# Patient Record
Sex: FEMALE | Race: WHITE | Employment: FULL TIME | URBAN - METROPOLITAN AREA
[De-identification: names, ages, dates, MRNs, and addresses within clinical notes are randomized per-mention and may not be internally consistent; named-entity substitution may affect disease eponyms.]

---

## 2018-03-17 ENCOUNTER — HOSPITAL ENCOUNTER (EMERGENCY)
Age: 33
Discharge: HOME | End: 2018-03-17
Attending: FAMILY MEDICINE
Payer: COMMERCIAL

## 2018-03-17 VITALS
WEIGHT: 192 LBS | DIASTOLIC BLOOD PRESSURE: 78 MMHG | RESPIRATION RATE: 18 BRPM | HEIGHT: 68 IN | BODY MASS INDEX: 29.1 KG/M2 | HEART RATE: 78 BPM | OXYGEN SATURATION: 100 % | TEMPERATURE: 98 F | SYSTOLIC BLOOD PRESSURE: 121 MMHG

## 2018-03-17 DIAGNOSIS — B02.9 HERPES ZOSTER WITHOUT COMPLICATION: Primary | ICD-10-CM

## 2018-03-17 PROCEDURE — 6370000000 HC RX 637 (ALT 250 FOR IP): Performed by: FAMILY MEDICINE

## 2018-03-17 PROCEDURE — 99283 EMERGENCY DEPT VISIT LOW MDM: CPT

## 2018-03-17 RX ORDER — TRAMADOL HYDROCHLORIDE 50 MG/1
50 TABLET ORAL EVERY 6 HOURS PRN
Qty: 12 TABLET | Refills: 0 | Status: SHIPPED | OUTPATIENT
Start: 2018-03-17 | End: 2018-03-17

## 2018-03-17 RX ORDER — CIPROFLOXACIN AND DEXAMETHASONE 3; 1 MG/ML; MG/ML
4 SUSPENSION/ DROPS AURICULAR (OTIC) 2 TIMES DAILY
Qty: 1 BOTTLE | Refills: 0 | Status: SHIPPED | OUTPATIENT
Start: 2018-03-17 | End: 2018-03-24

## 2018-03-17 RX ORDER — DOXYCYCLINE 100 MG/1
100 CAPSULE ORAL 2 TIMES DAILY
Qty: 20 CAPSULE | Refills: 0 | Status: SHIPPED | OUTPATIENT
Start: 2018-03-17 | End: 2018-03-27

## 2018-03-17 RX ORDER — VALACYCLOVIR HYDROCHLORIDE 500 MG/1
1000 TABLET, FILM COATED ORAL ONCE
Status: COMPLETED | OUTPATIENT
Start: 2018-03-17 | End: 2018-03-17

## 2018-03-17 RX ORDER — FLUTICASONE PROPIONATE 50 MCG
1 SPRAY, SUSPENSION (ML) NASAL DAILY
COMMUNITY

## 2018-03-17 RX ORDER — IBUPROFEN 800 MG/1
800 TABLET ORAL EVERY 6 HOURS PRN
COMMUNITY

## 2018-03-17 RX ORDER — AMOXICILLIN 875 MG/1
875 TABLET, COATED ORAL 2 TIMES DAILY
COMMUNITY
End: 2022-08-01

## 2018-03-17 RX ORDER — VALACYCLOVIR HYDROCHLORIDE 500 MG/1
1000 TABLET, FILM COATED ORAL 2 TIMES DAILY
Qty: 40 TABLET | Refills: 0 | Status: SHIPPED | OUTPATIENT
Start: 2018-03-17 | End: 2018-03-27

## 2018-03-17 RX ADMIN — VALACYCLOVIR HYDROCHLORIDE 1000 MG: 500 TABLET, FILM COATED ORAL at 18:32

## 2018-03-17 ASSESSMENT — PAIN DESCRIPTION - ORIENTATION: ORIENTATION: RIGHT

## 2018-03-17 ASSESSMENT — PAIN DESCRIPTION - PAIN TYPE: TYPE: ACUTE PAIN

## 2018-03-17 ASSESSMENT — PAIN SCALES - GENERAL
PAINLEVEL_OUTOF10: 10
PAINLEVEL_OUTOF10: 8

## 2018-03-17 ASSESSMENT — PAIN DESCRIPTION - DESCRIPTORS: DESCRIPTORS: PRESSURE

## 2018-03-17 ASSESSMENT — PAIN DESCRIPTION - ONSET: ONSET: ON-GOING

## 2018-03-17 ASSESSMENT — PAIN DESCRIPTION - PROGRESSION: CLINICAL_PROGRESSION: GRADUALLY WORSENING

## 2018-03-17 ASSESSMENT — PAIN DESCRIPTION - FREQUENCY: FREQUENCY: INTERMITTENT

## 2018-03-17 NOTE — ED PROVIDER NOTES
[address] [facility] ED  eMERGENCY dEPARTMENT eNCOUnter      Pt Name: Rodney Roman  MRN: [id number]  [place] 1985  Date of evaluation: 3/17/2018  Provider: MD Tracy Snell       Chief Complaint   Patient presents with    Ear Problem     left ear. redness and swelling since thursday night with left sinus/jaw pain. HISTORY OF PRESENT ILLNESS   (Location/Symptom, Timing/Onset, Context/Setting, Quality, Duration, Modifying Factors, Severity)  Note limiting factors. Rodney Roman is a 27 y.o. female who presents to the emergency department  Patient presented Patient present with Left external ear pain and redness clear drainage  ×3 days Woke up this morning noted redness and swelling of the ear with some sores and vesicular lesion. She also have been having a sinus infection for the last week was already seen by her primary care and started on antibiotic for        The history is provided by the patient. Nursing Notes were reviewed. REVIEW OF SYSTEMS    (2-9 systems for level 4, 10 or more for level 5)     Review of Systems   Constitutional: Negative. HENT: Positive for congestion, ear discharge, ear pain, rhinorrhea, sinus pain, sinus pressure and sneezing. Negative for facial swelling, hearing loss, mouth sores, nosebleeds, postnasal drip, sore throat, tinnitus, trouble swallowing and voice change. Eyes: Negative. Respiratory: Negative. Cardiovascular: Negative. Gastrointestinal: Negative. Endocrine: Negative. Genitourinary: Negative. Musculoskeletal: Negative. Neurological: Negative. Hematological: Negative. Psychiatric/Behavioral: Negative. Except as noted above the remainder of the review of systems was reviewed and negative. PAST MEDICAL HISTORY   History reviewed. No pertinent past medical history.       SURGICAL HISTORY       Past Surgical History:   Procedure Laterality Date     SECTION      4         CURRENT 1. Herpes zoster without complication          DISPOSITION/PLAN   DISPOSITION Decision To Discharge 03/17/2018 06:54:11 PM      PATIENT REFERRED TO:  No follow-up provider specified.     DISCHARGE MEDICATIONS:  Discharge Medication List as of 3/17/2018  6:56 PM      START taking these medications    Details   valACYclovir (VALTREX) 500 MG tablet Take 2 tablets by mouth 2 times daily for 10 days, Disp-40 tablet, R-0Print      doxycycline monohydrate (MONODOX) 100 MG capsule Take 1 capsule by mouth 2 times daily for 10 days, Disp-20 capsule, R-0Print      ciprofloxacin-dexamethasone (CIPRODEX) 0.3-0.1 % otic suspension Place 4 drops in ear(s) 2 times daily for 7 days, Disp-1 Bottle, R-0Print                (Please note that portions of this note were completed with a voice recognition program.  Efforts were made to edit the dictations but occasionally words are mis-transcribed.)    Martin Holloway MD (electronically signed)  Attending Emergency Physician          Belinda Ornelas MD  03/18/18 [phone]

## 2018-03-18 ASSESSMENT — ENCOUNTER SYMPTOMS
RESPIRATORY NEGATIVE: 1
GASTROINTESTINAL NEGATIVE: 1
SORE THROAT: 0
EYES NEGATIVE: 1
TROUBLE SWALLOWING: 0
RHINORRHEA: 1
VOICE CHANGE: 0
SINUS PRESSURE: 1
FACIAL SWELLING: 0
SINUS PAIN: 1

## 2019-03-11 ENCOUNTER — HOSPITAL ENCOUNTER (EMERGENCY)
Age: 34
Discharge: HOME | End: 2019-03-11
Attending: EMERGENCY MEDICINE
Payer: COMMERCIAL

## 2019-03-11 VITALS
RESPIRATION RATE: 16 BRPM | TEMPERATURE: 98.9 F | DIASTOLIC BLOOD PRESSURE: 73 MMHG | HEART RATE: 85 BPM | HEIGHT: 68 IN | SYSTOLIC BLOOD PRESSURE: 141 MMHG | BODY MASS INDEX: 30.77 KG/M2 | WEIGHT: 203 LBS | OXYGEN SATURATION: 99 %

## 2019-03-11 DIAGNOSIS — J01.10 ACUTE FRONTAL SINUSITIS, RECURRENCE NOT SPECIFIED: ICD-10-CM

## 2019-03-11 DIAGNOSIS — R05.9 COUGH: Primary | ICD-10-CM

## 2019-03-11 LAB
RAPID INFLUENZA  B AGN: NEGATIVE
RAPID INFLUENZA A AGN: NEGATIVE
S PYO AG THROAT QL: NEGATIVE

## 2019-03-11 PROCEDURE — 99283 EMERGENCY DEPT VISIT LOW MDM: CPT

## 2019-03-11 PROCEDURE — 87081 CULTURE SCREEN ONLY: CPT

## 2019-03-11 PROCEDURE — 87880 STREP A ASSAY W/OPTIC: CPT

## 2019-03-11 PROCEDURE — 87804 INFLUENZA ASSAY W/OPTIC: CPT

## 2019-03-11 RX ORDER — PREDNISONE 20 MG/1
20 TABLET ORAL DAILY
Qty: 5 TABLET | Refills: 0 | Status: SHIPPED | OUTPATIENT
Start: 2019-03-11 | End: 2019-03-16

## 2019-03-11 RX ORDER — GUAIFENESIN AND PSEUDOEPHEDRINE HCL 1200; 120 MG/1; MG/1
1 TABLET, EXTENDED RELEASE ORAL 2 TIMES DAILY
Qty: 20 TABLET | Refills: 0 | Status: SHIPPED | OUTPATIENT
Start: 2019-03-11 | End: 2022-08-16

## 2019-03-11 RX ORDER — DOXYCYCLINE 100 MG/1
100 TABLET ORAL 2 TIMES DAILY
Qty: 20 TABLET | Refills: 0 | Status: SHIPPED | OUTPATIENT
Start: 2019-03-11 | End: 2019-03-21

## 2019-03-11 ASSESSMENT — ENCOUNTER SYMPTOMS
SORE THROAT: 0
BACK PAIN: 0
EYE PAIN: 0
COUGH: 1
WHEEZING: 0
VOMITING: 0
CONSTIPATION: 0
CHOKING: 0
CHEST TIGHTNESS: 0
STRIDOR: 0
SINUS PRESSURE: 0
FACIAL SWELLING: 1
SHORTNESS OF BREATH: 0
ABDOMINAL PAIN: 0
BLOOD IN STOOL: 0
RHINORRHEA: 1
EYE REDNESS: 0
DIARRHEA: 0
VOICE CHANGE: 0
EYE DISCHARGE: 0
TROUBLE SWALLOWING: 0

## 2019-03-11 ASSESSMENT — PAIN SCALES - GENERAL: PAINLEVEL_OUTOF10: 6

## 2019-03-11 ASSESSMENT — PAIN DESCRIPTION - ORIENTATION: ORIENTATION: LEFT

## 2019-03-11 ASSESSMENT — PAIN DESCRIPTION - LOCATION: LOCATION: FACE;HEAD

## 2019-03-14 LAB — S PYO THROAT QL CULT: NORMAL

## 2022-08-01 PROBLEM — J30.2 SEASONAL ALLERGIES: Status: ACTIVE | Noted: 2022-08-01

## 2022-08-01 PROBLEM — R00.0 TACHYCARDIA: Status: ACTIVE | Noted: 2022-08-01

## 2022-08-01 PROBLEM — E66.3 OVERWEIGHT: Status: ACTIVE | Noted: 2022-08-01

## 2022-08-01 PROBLEM — D50.9 IRON DEFICIENCY ANEMIA: Status: ACTIVE | Noted: 2022-08-01

## 2022-08-01 PROBLEM — E55.9 VITAMIN D DEFICIENCY: Status: ACTIVE | Noted: 2022-08-01

## 2022-08-16 ENCOUNTER — OFFICE VISIT (OUTPATIENT)
Dept: OBGYN CLINIC | Age: 37
End: 2022-08-16
Payer: COMMERCIAL

## 2022-08-16 VITALS
SYSTOLIC BLOOD PRESSURE: 92 MMHG | WEIGHT: 207 LBS | HEART RATE: 94 BPM | DIASTOLIC BLOOD PRESSURE: 66 MMHG | BODY MASS INDEX: 31.47 KG/M2

## 2022-08-16 DIAGNOSIS — Z11.51 ENCOUNTER FOR SCREENING FOR HUMAN PAPILLOMAVIRUS (HPV): ICD-10-CM

## 2022-08-16 DIAGNOSIS — Z11.51 ENCOUNTER FOR SCREENING FOR HUMAN PAPILLOMAVIRUS (HPV): Primary | ICD-10-CM

## 2022-08-16 DIAGNOSIS — N92.0 MENORRHAGIA WITH REGULAR CYCLE: ICD-10-CM

## 2022-08-16 DIAGNOSIS — Z01.419 WOMEN'S ANNUAL ROUTINE GYNECOLOGICAL EXAMINATION: ICD-10-CM

## 2022-08-16 PROCEDURE — 99385 PREV VISIT NEW AGE 18-39: CPT | Performed by: OBSTETRICS & GYNECOLOGY

## 2022-08-16 RX ORDER — CETIRIZINE HYDROCHLORIDE 10 MG/1
TABLET ORAL
COMMUNITY
Start: 2022-07-17

## 2022-08-16 ASSESSMENT — ENCOUNTER SYMPTOMS
NAUSEA: 0
TROUBLE SWALLOWING: 0
BLOOD IN STOOL: 0
SHORTNESS OF BREATH: 0
VOICE CHANGE: 0
COUGH: 0
BACK PAIN: 0
ABDOMINAL DISTENTION: 0
WHEEZING: 0
ABDOMINAL PAIN: 0
CONSTIPATION: 0
VOMITING: 0
SORE THROAT: 0
CHEST TIGHTNESS: 0
COLOR CHANGE: 0

## 2022-08-16 NOTE — PROGRESS NOTES
HPI:  Verónica Wagner (: 1985) is a 42 y.o. female, New patient, here for evaluation of the following chief complaint(s): Annual Exam  Complains of menorrhagia. She also has back pain. Patient has tried hormonal contraception to control the bleeding which has been unsuccessful. Her last Pap was 7 years ago. She does have a history of abnormal Pap smears. She has had 4 prior  section      SUBJECTIVE/OBJECTIVE:    Past Surgical History:   Procedure Laterality Date     SECTION      4    CHOLECYSTECTOMY      TONSILLECTOMY          Review of Systems   Constitutional:  Negative for activity change, appetite change, fatigue and unexpected weight change. HENT:  Negative for dental problem, ear pain, hearing loss, nosebleeds, sore throat, trouble swallowing and voice change. Eyes:  Negative for visual disturbance. Respiratory:  Negative for cough, chest tightness, shortness of breath and wheezing. Cardiovascular:  Negative for chest pain and palpitations. Gastrointestinal:  Negative for abdominal distention, abdominal pain, blood in stool, constipation, nausea and vomiting. Endocrine: Negative for cold intolerance, heat intolerance, polydipsia, polyphagia and polyuria. Genitourinary:  Positive for menstrual problem and pelvic pain. Negative for difficulty urinating, dyspareunia, dysuria, flank pain, frequency, genital sores, hematuria, urgency, vaginal bleeding, vaginal discharge and vaginal pain. Musculoskeletal:  Negative for arthralgias, back pain, joint swelling and myalgias. Skin:  Negative for color change and rash. Allergic/Immunologic: Negative for environmental allergies, food allergies and immunocompromised state. Neurological:  Negative for dizziness, seizures, syncope, speech difficulty, weakness, numbness and headaches. Hematological:  Negative for adenopathy. Does not bruise/bleed easily.    Psychiatric/Behavioral:  Negative for agitation, behavioral problems, confusion, decreased concentration, dysphoric mood and suicidal ideas. The patient is not nervous/anxious and is not hyperactive. Physical Exam  Vitals and nursing note reviewed. Exam conducted with a chaperone present. Chest:       Genitourinary:     General: Normal vulva. Vitals:    08/16/22 1128   BP: 92/66   Pulse: 94   Weight: 207 lb (93.9 kg)         ASSESSMENT/PLAN:     Diagnosis Orders   1. Encounter for screening for human papillomavirus (HPV)        2. Women's annual routine gynecological examination        Menorrhagia      PLAN: Operative laparoscopy with bilateral salpingectomy. Hysteroscopy NovaSure ablation and a D&C. Risk benefits alternatives of the procedure were explained to her in detail      No follow-ups on file. On this date 8/16/2022 I have spent 30 minutes reviewing previous notes, test results and face to face with the patient discussing the diagnosis and importance of compliance with the treatment plan as well as documenting on the day of the visit. An electronic signature was used to authenticate this note. Please note this report has been partially produced using speech recognition software and may cause contain errors related to that system including grammar, punctuation and spelling as well as words and phrases that may seem inappropriate. If there are questions or concerns please feel free to contact me to clarify.

## 2022-08-23 LAB
HPV COMMENT: NORMAL
HPV TYPE 16: NOT DETECTED
HPV TYPE 18: NOT DETECTED
HPVOH (OTHER TYPES): NOT DETECTED

## 2022-09-21 ENCOUNTER — TELEPHONE (OUTPATIENT)
Dept: OBGYN CLINIC | Age: 37
End: 2022-09-21

## 2022-09-21 NOTE — TELEPHONE ENCOUNTER
Pt has PAT at 9 am on 9/21. She will need to cancel this. She is not feeling well and will also have to cancel her surgery as well on Monday. Pt states she will call back when she is feeling better.

## 2023-07-19 LAB
ALANINE AMINOTRANSFERASE (SGPT) (U/L) IN SER/PLAS: 20 U/L (ref 7–45)
ALBUMIN (G/DL) IN SER/PLAS: 3.8 G/DL (ref 3.4–5)
ALKALINE PHOSPHATASE (U/L) IN SER/PLAS: 51 U/L (ref 33–110)
ANION GAP IN SER/PLAS: 9 MMOL/L (ref 10–20)
ASPARTATE AMINOTRANSFERASE (SGOT) (U/L) IN SER/PLAS: 17 U/L (ref 9–39)
BASOPHILS (10*3/UL) IN BLOOD BY AUTOMATED COUNT: 0.04 X10E9/L (ref 0–0.1)
BASOPHILS/100 LEUKOCYTES IN BLOOD BY AUTOMATED COUNT: 0.5 % (ref 0–2)
BILIRUBIN TOTAL (MG/DL) IN SER/PLAS: 0.5 MG/DL (ref 0–1.2)
CALCIDIOL (25 OH VITAMIN D3) (NG/ML) IN SER/PLAS: 23 NG/ML
CALCIUM (MG/DL) IN SER/PLAS: 9.1 MG/DL (ref 8.6–10.3)
CARBON DIOXIDE, TOTAL (MMOL/L) IN SER/PLAS: 28 MMOL/L (ref 21–32)
CHLORIDE (MMOL/L) IN SER/PLAS: 106 MMOL/L (ref 98–107)
CHOLESTEROL (MG/DL) IN SER/PLAS: 151 MG/DL (ref 0–199)
CHOLESTEROL IN HDL (MG/DL) IN SER/PLAS: 29.3 MG/DL
CHOLESTEROL/HDL RATIO: 5.2
COBALAMIN (VITAMIN B12) (PG/ML) IN SER/PLAS: 462 PG/ML (ref 211–911)
CREATININE (MG/DL) IN SER/PLAS: 0.71 MG/DL (ref 0.5–1.05)
EOSINOPHILS (10*3/UL) IN BLOOD BY AUTOMATED COUNT: 0.25 X10E9/L (ref 0–0.7)
EOSINOPHILS/100 LEUKOCYTES IN BLOOD BY AUTOMATED COUNT: 2.9 % (ref 0–6)
ERYTHROCYTE DISTRIBUTION WIDTH (RATIO) BY AUTOMATED COUNT: 13.9 % (ref 11.5–14.5)
ERYTHROCYTE MEAN CORPUSCULAR HEMOGLOBIN CONCENTRATION (G/DL) BY AUTOMATED: 32.6 G/DL (ref 32–36)
ERYTHROCYTE MEAN CORPUSCULAR VOLUME (FL) BY AUTOMATED COUNT: 88 FL (ref 80–100)
ERYTHROCYTES (10*6/UL) IN BLOOD BY AUTOMATED COUNT: 4.76 X10E12/L (ref 4–5.2)
FERRITIN (UG/LL) IN SER/PLAS: 54 UG/L (ref 8–150)
GFR FEMALE: >90 ML/MIN/1.73M2
GLUCOSE (MG/DL) IN SER/PLAS: 84 MG/DL (ref 74–99)
HEMATOCRIT (%) IN BLOOD BY AUTOMATED COUNT: 41.7 % (ref 36–46)
HEMOGLOBIN (G/DL) IN BLOOD: 13.6 G/DL (ref 12–16)
IMMATURE GRANULOCYTES/100 LEUKOCYTES IN BLOOD BY AUTOMATED COUNT: 0.2 % (ref 0–0.9)
IRON (UG/DL) IN SER/PLAS: 159 UG/DL (ref 35–150)
IRON BINDING CAPACITY (UG/DL) IN SER/PLAS: 280 UG/DL (ref 240–445)
IRON SATURATION (%) IN SER/PLAS: 57 % (ref 25–45)
LDL: 89 MG/DL (ref 0–99)
LEUKOCYTES (10*3/UL) IN BLOOD BY AUTOMATED COUNT: 8.7 X10E9/L (ref 4.4–11.3)
LYMPHOCYTES (10*3/UL) IN BLOOD BY AUTOMATED COUNT: 4.47 X10E9/L (ref 1.2–4.8)
LYMPHOCYTES/100 LEUKOCYTES IN BLOOD BY AUTOMATED COUNT: 51.4 % (ref 13–44)
MONOCYTES (10*3/UL) IN BLOOD BY AUTOMATED COUNT: 0.74 X10E9/L (ref 0.1–1)
MONOCYTES/100 LEUKOCYTES IN BLOOD BY AUTOMATED COUNT: 8.5 % (ref 2–10)
NEUTROPHILS (10*3/UL) IN BLOOD BY AUTOMATED COUNT: 3.18 X10E9/L (ref 1.2–7.7)
NEUTROPHILS/100 LEUKOCYTES IN BLOOD BY AUTOMATED COUNT: 36.5 % (ref 40–80)
PLATELETS (10*3/UL) IN BLOOD AUTOMATED COUNT: 313 X10E9/L (ref 150–450)
POTASSIUM (MMOL/L) IN SER/PLAS: 3.9 MMOL/L (ref 3.5–5.3)
PROTEIN TOTAL: 6.4 G/DL (ref 6.4–8.2)
SODIUM (MMOL/L) IN SER/PLAS: 139 MMOL/L (ref 136–145)
THYROTROPIN (MIU/L) IN SER/PLAS BY DETECTION LIMIT <= 0.05 MIU/L: 1.42 MIU/L (ref 0.44–3.98)
TRIGLYCERIDE (MG/DL) IN SER/PLAS: 163 MG/DL (ref 0–149)
UREA NITROGEN (MG/DL) IN SER/PLAS: 13 MG/DL (ref 6–23)
VLDL: 33 MG/DL (ref 0–40)

## 2023-09-21 ENCOUNTER — TELEMEDICINE (OUTPATIENT)
Dept: PRIMARY CARE | Facility: CLINIC | Age: 38
End: 2023-09-21
Payer: COMMERCIAL

## 2023-09-21 DIAGNOSIS — K04.7 DENTAL INFECTION: Primary | ICD-10-CM

## 2023-09-21 PROCEDURE — 99213 OFFICE O/P EST LOW 20 MIN: CPT | Performed by: STUDENT IN AN ORGANIZED HEALTH CARE EDUCATION/TRAINING PROGRAM

## 2023-09-21 RX ORDER — CEFUROXIME AXETIL 500 MG/1
500 TABLET ORAL 2 TIMES DAILY
Qty: 20 TABLET | Refills: 0 | Status: SHIPPED | OUTPATIENT
Start: 2023-09-21 | End: 2023-10-02 | Stop reason: WASHOUT

## 2023-09-21 RX ORDER — METRONIDAZOLE 500 MG/1
250 TABLET ORAL 4 TIMES DAILY
Qty: 20 TABLET | Refills: 0 | Status: SHIPPED | OUTPATIENT
Start: 2023-09-21 | End: 2023-10-02 | Stop reason: WASHOUT

## 2023-09-21 NOTE — PROGRESS NOTES
Subjective   Patient ID: Dolly Mulligan is a 38 y.o. female who presents for Dental Pain.    HPI    Dental infection being treated with amoxacillin, which is not helping. Has used Clindamycin for a different infection before that. Now pain radiating to the ear as well. Worsening symptoms.     Review of Systems  ROS negative except discussed above in HPI.    There were no vitals filed for this visit.  Objective   Physical Exam  Unable to exam due to the nature of the visit.     Assessment/Plan   Dolly was seen today for dental pain.  Diagnoses and all orders for this visit:  Dental infection (Primary)  -     metroNIDAZOLE (Flagyl) 500 mg tablet; Take 0.5 tablets (250 mg) by mouth 4 times a day for 10 days.  -     cefuroxime (Ceftin) 500 mg tablet; Take 1 tablet (500 mg) by mouth 2 times a day for 10 days.  Discussed about the potential complications including dental abscess which may need to be drained. Recommended to go see dentist or other physician to get further evaluation if abx are not helping.     Follow up as needed.         Dave Andersen MD MPH

## 2023-09-30 PROBLEM — M25.519 SHOULDER PAIN: Status: ACTIVE | Noted: 2023-09-30

## 2023-09-30 PROBLEM — M77.8 TENDINITIS OF RIGHT SHOULDER: Status: ACTIVE | Noted: 2023-09-30

## 2023-09-30 PROBLEM — S93.609A FOOT SPRAIN: Status: ACTIVE | Noted: 2023-09-30

## 2023-09-30 PROBLEM — L72.3 SEBACEOUS CYST OF EAR: Status: ACTIVE | Noted: 2023-09-30

## 2023-09-30 PROBLEM — B02.9 SHINGLES: Status: ACTIVE | Noted: 2023-09-30

## 2023-09-30 PROBLEM — B37.31 YEAST VAGINITIS: Status: ACTIVE | Noted: 2023-09-30

## 2023-09-30 PROBLEM — H69.93 DYSFUNCTION OF BOTH EUSTACHIAN TUBES: Status: ACTIVE | Noted: 2023-09-30

## 2023-09-30 PROBLEM — H73.891 TYMPANIC MEMBRANE RETRACTION, RIGHT: Status: ACTIVE | Noted: 2023-09-30

## 2023-09-30 PROBLEM — H90.41 SENSORINEURAL HEARING LOSS (SNHL) OF RIGHT EAR WITH UNRESTRICTED HEARING OF LEFT EAR: Status: ACTIVE | Noted: 2023-09-30

## 2023-09-30 PROBLEM — R00.0 TACHYCARDIA: Status: ACTIVE | Noted: 2022-08-01

## 2023-09-30 PROBLEM — F41.9 ANXIETY: Status: ACTIVE | Noted: 2023-09-30

## 2023-09-30 PROBLEM — Z79.899 DRUG THERAPY: Status: ACTIVE | Noted: 2023-09-30

## 2023-09-30 PROBLEM — M79.676 TOE PAIN: Status: ACTIVE | Noted: 2023-09-30

## 2023-09-30 PROBLEM — E78.5 HYPERLIPIDEMIA: Status: ACTIVE | Noted: 2023-09-30

## 2023-09-30 PROBLEM — T78.40XA ALLERGIC REACTION: Status: ACTIVE | Noted: 2023-09-30

## 2023-09-30 PROBLEM — D50.9 IRON DEFICIENCY ANEMIA: Status: ACTIVE | Noted: 2022-08-01

## 2023-09-30 PROBLEM — M54.2 NECK PAIN: Status: ACTIVE | Noted: 2023-09-30

## 2023-09-30 PROBLEM — S90.129A CONTUSION OF TOE: Status: ACTIVE | Noted: 2023-09-30

## 2023-09-30 PROBLEM — S90.30XA FOOT CONTUSION: Status: ACTIVE | Noted: 2023-09-30

## 2023-09-30 PROBLEM — M25.579 ANKLE PAIN: Status: ACTIVE | Noted: 2023-09-30

## 2023-09-30 PROBLEM — R79.89 ABNORMAL TSH: Status: ACTIVE | Noted: 2023-09-30

## 2023-09-30 PROBLEM — E04.1 THYROID NODULE: Status: ACTIVE | Noted: 2023-09-30

## 2023-09-30 PROBLEM — E04.9 GOITER: Status: ACTIVE | Noted: 2023-09-30

## 2023-09-30 PROBLEM — M67.40 GANGLION CYST: Status: ACTIVE | Noted: 2023-09-30

## 2023-09-30 PROBLEM — E66.3 OVERWEIGHT: Status: ACTIVE | Noted: 2022-08-01

## 2023-09-30 PROBLEM — H66.90 RECURRENT OTITIS MEDIA: Status: ACTIVE | Noted: 2023-09-30

## 2023-09-30 PROBLEM — E55.9 VITAMIN D DEFICIENCY: Status: ACTIVE | Noted: 2022-08-01

## 2023-09-30 PROBLEM — M79.673 FOOT PAIN: Status: ACTIVE | Noted: 2023-09-30

## 2023-09-30 PROBLEM — H65.21 CHRONIC SEROUS OTITIS MEDIA OF RIGHT EAR: Status: ACTIVE | Noted: 2023-09-30

## 2023-09-30 PROBLEM — R59.0 CERVICAL LYMPHADENOPATHY: Status: ACTIVE | Noted: 2023-09-30

## 2023-09-30 PROBLEM — R22.1 NECK SWELLING: Status: ACTIVE | Noted: 2023-09-30

## 2023-09-30 RX ORDER — CETIRIZINE HYDROCHLORIDE 10 MG/1
10 TABLET ORAL DAILY
COMMUNITY

## 2023-09-30 RX ORDER — ACETAMINOPHEN 325 MG/1
TABLET ORAL
COMMUNITY

## 2023-09-30 RX ORDER — LIDOCAINE 40 MG/G
CREAM TOPICAL 4 TIMES DAILY
COMMUNITY
Start: 2023-09-09

## 2023-09-30 RX ORDER — CHOLECALCIFEROL (VITAMIN D3) 125 MCG
125 CAPSULE ORAL DAILY
COMMUNITY
Start: 2017-10-11 | End: 2023-10-02 | Stop reason: ALTCHOICE

## 2023-09-30 RX ORDER — ALBUTEROL SULFATE 90 UG/1
2 AEROSOL, METERED RESPIRATORY (INHALATION) 4 TIMES DAILY PRN
COMMUNITY
Start: 2022-11-01

## 2023-09-30 RX ORDER — AZELASTINE 1 MG/ML
SPRAY, METERED NASAL
COMMUNITY
Start: 2021-11-21

## 2023-09-30 RX ORDER — IBUPROFEN 600 MG/1
600 TABLET ORAL AS NEEDED
COMMUNITY
Start: 2017-08-01

## 2023-09-30 RX ORDER — ERGOCALCIFEROL 1.25 MG/1
1 CAPSULE ORAL
COMMUNITY
Start: 2023-07-19

## 2023-09-30 RX ORDER — AZELASTINE HYDROCHLORIDE 0.5 MG/ML
SOLUTION/ DROPS OPHTHALMIC
COMMUNITY
Start: 2021-12-21

## 2023-09-30 RX ORDER — ACETAMINOPHEN AND CODEINE PHOSPHATE 300; 30 MG/1; MG/1
1 TABLET ORAL AS NEEDED
COMMUNITY

## 2023-09-30 RX ORDER — HYDROCODONE BITARTRATE AND ACETAMINOPHEN 5; 325 MG/1; MG/1
1-2 TABLET ORAL AS NEEDED
COMMUNITY
Start: 2022-01-12

## 2023-09-30 RX ORDER — FLUTICASONE PROPIONATE 50 MCG
1 SPRAY, SUSPENSION (ML) NASAL DAILY
COMMUNITY

## 2023-10-02 ENCOUNTER — OFFICE VISIT (OUTPATIENT)
Dept: PRIMARY CARE | Facility: CLINIC | Age: 38
End: 2023-10-02
Payer: COMMERCIAL

## 2023-10-02 VITALS
TEMPERATURE: 97.9 F | HEART RATE: 76 BPM | SYSTOLIC BLOOD PRESSURE: 122 MMHG | OXYGEN SATURATION: 98 % | BODY MASS INDEX: 31.31 KG/M2 | RESPIRATION RATE: 18 BRPM | WEIGHT: 194 LBS | DIASTOLIC BLOOD PRESSURE: 76 MMHG

## 2023-10-02 DIAGNOSIS — J06.9 ACUTE URI: Primary | ICD-10-CM

## 2023-10-02 LAB — POC RAPID STREP: NEGATIVE

## 2023-10-02 PROCEDURE — 87880 STREP A ASSAY W/OPTIC: CPT | Performed by: FAMILY MEDICINE

## 2023-10-02 PROCEDURE — 99214 OFFICE O/P EST MOD 30 MIN: CPT | Performed by: FAMILY MEDICINE

## 2023-10-02 ASSESSMENT — ENCOUNTER SYMPTOMS
DIARRHEA: 0
FEVER: 0
WHEEZING: 0
SHORTNESS OF BREATH: 0
SORE THROAT: 1
DIFFICULTY URINATING: 0
TROUBLE SWALLOWING: 1
VOMITING: 0
MYALGIAS: 1
RHINORRHEA: 1
HEADACHES: 1
FATIGUE: 1
NAUSEA: 0
COUGH: 1

## 2023-10-02 NOTE — ASSESSMENT & PLAN NOTE
Advise testing for rst/pcr, covid/flu  Will tx if needed  May use azelastind ns,cetirizine and flonase  Tylenol or motrin, delsym as needed  F/up if no improvement

## 2023-10-02 NOTE — PROGRESS NOTES
Subjective   Patient ID: Dolly Mulligan is a 38 y.o. female who presents for Sore Throat.    Sore Throat   This is a new problem. The current episode started in the past 7 days. The problem has been unchanged. Associated symptoms include congestion, coughing, ear pain, headaches and trouble swallowing. Pertinent negatives include no diarrhea, ear discharge, shortness of breath or vomiting. She has tried NSAIDs for the symptoms. The treatment provided mild relief.        Review of Systems   Constitutional:  Positive for fatigue. Negative for fever.        Symptoms x 4 d   HENT:  Positive for congestion, ear pain, rhinorrhea, sore throat and trouble swallowing. Negative for ear discharge.    Respiratory:  Positive for cough. Negative for shortness of breath and wheezing.    Gastrointestinal:  Negative for diarrhea, nausea and vomiting.   Genitourinary:  Negative for difficulty urinating.   Musculoskeletal:  Positive for myalgias.   Neurological:  Positive for headaches.       Objective   /76   Pulse 76   Temp 36.6 °C (97.9 °F) (Temporal)   Resp 18   Wt 88 kg (194 lb)   SpO2 98%   BMI 31.31 kg/m²     Physical Exam  Vitals and nursing note reviewed.   Constitutional:       General: She is not in acute distress.     Appearance: Normal appearance.   HENT:      Head: Normocephalic and atraumatic.      Right Ear: Tympanic membrane, ear canal and external ear normal.      Left Ear: Tympanic membrane, ear canal and external ear normal.      Nose: Congestion present.      Mouth/Throat:      Mouth: Mucous membranes are moist.      Pharynx: Posterior oropharyngeal erythema present.   Cardiovascular:      Rate and Rhythm: Normal rate and regular rhythm.      Heart sounds: Normal heart sounds.   Pulmonary:      Effort: Pulmonary effort is normal.      Breath sounds: Normal breath sounds.   Musculoskeletal:      Cervical back: Neck supple.   Lymphadenopathy:      Cervical: No cervical adenopathy.   Skin:     General:  Skin is warm and dry.   Neurological:      Mental Status: She is alert.         Assessment/Plan   Problem List Items Addressed This Visit             ICD-10-CM    Acute URI - Primary J06.9     Advise testing for rst/pcr, covid/flu  Will tx if needed  May use azelastind ns,cetirizine and flonase  Tylenol or motrin, delsym as needed  F/up if no improvement         Relevant Orders    POCT rapid strep A manually resulted (Completed)    Group A Streptococcus, PCR    Sars-CoV-2 PCR, Symptomatic    Influenza A, and B PCR

## 2023-10-03 ENCOUNTER — OFFICE VISIT (OUTPATIENT)
Dept: OTOLARYNGOLOGY | Facility: CLINIC | Age: 38
End: 2023-10-03
Payer: COMMERCIAL

## 2023-10-03 VITALS — WEIGHT: 197 LBS | BODY MASS INDEX: 31.66 KG/M2 | HEIGHT: 66 IN

## 2023-10-03 DIAGNOSIS — J01.90 ACUTE NON-RECURRENT SINUSITIS, UNSPECIFIED LOCATION: Primary | ICD-10-CM

## 2023-10-03 LAB
FLUAV RNA RESP QL NAA+PROBE: NOT DETECTED
FLUBV RNA RESP QL NAA+PROBE: NOT DETECTED
S PYO DNA THROAT QL NAA+PROBE: NOT DETECTED
SARS-COV-2 RNA RESP QL NAA+PROBE: NOT DETECTED

## 2023-10-03 PROCEDURE — 99213 OFFICE O/P EST LOW 20 MIN: CPT | Performed by: PHYSICIAN ASSISTANT

## 2023-10-03 RX ORDER — DOXYCYCLINE HYCLATE 100 MG
100 TABLET ORAL 2 TIMES DAILY
Qty: 20 TABLET | Refills: 0 | Status: SHIPPED | OUTPATIENT
Start: 2023-10-03 | End: 2023-10-13

## 2023-10-03 NOTE — PROGRESS NOTES
The patient is a 38-year-old female who presents to the office today for assessment and follow-up on her left neck and ear.  In the left postauricular region the patient states that she had a small area of possible infection which she thought was possibly shingles as she had pain as well as rash on the left side of her neck.  She states that she was treated with antibiotics in the emergency department.  This was approximately 3 to 4 weeks ago and has now since resolved.  She denies any current pain or any change in hearing.  The patient does however state that since making her appointment for her ear she has also developed sinus infection with nasal congestion and thick and yellow-green mucus.  She is without other ENT related concerns.  There have been no significant changes in past medical or past surgical histories except as mentioned.    Physical exam:    General appearance: No acute distress. Normal facies. Symmetric facial movement. No gross lesions of the face are noted.  The external ear structures appear normal. The ear canals patent and the tympanic membranes are intact without evidence of air-fluid levels, retraction, or congenital defects.  Anterior rhinoscopy notes essentially a midline nasal septum.  Patient with kristen mucopus present in the nasal vault bilaterally.  Examination is noted for normal healthy mucosal membranes without any evidence of lesions, polyps, or exudate. The tongue is normally mobile. There are no lesions on the gingiva, buccal, or oral mucosa. There are no oral cavity masses.  The neck is negative for mass lymphadenopathy. The trachea and parotid are clear. The thyroid bed is grossly unremarkable. The salivary gland structures are grossly unremarkable.    Assessment and plan:    Sinusitis    Patient seen in the office today for assessment and follow-up on recent left postauricular ear pain and rash which is now resolved.  Based on patient's descriptions this could have been  possible shingles.  She is without any hearing concerns and there is no rash or pain today.  The patient does however have acute sinusitis and at this time will be started on doxycycline which was sent to her pharmacy.  I will see her back if her symptoms do not improve.

## 2024-07-19 ENCOUNTER — OFFICE VISIT (OUTPATIENT)
Dept: FAMILY MEDICINE CLINIC | Age: 39
End: 2024-07-19
Payer: COMMERCIAL

## 2024-07-19 VITALS
HEART RATE: 94 BPM | OXYGEN SATURATION: 100 % | HEIGHT: 68 IN | BODY MASS INDEX: 28.1 KG/M2 | WEIGHT: 185.4 LBS | SYSTOLIC BLOOD PRESSURE: 124 MMHG | TEMPERATURE: 98.7 F | DIASTOLIC BLOOD PRESSURE: 82 MMHG

## 2024-07-19 DIAGNOSIS — T36.95XA ANTIBIOTIC-INDUCED YEAST INFECTION: ICD-10-CM

## 2024-07-19 DIAGNOSIS — K13.79 ORAL SOFT TISSUE COMPLAINT: ICD-10-CM

## 2024-07-19 DIAGNOSIS — K04.7 DENTAL ABSCESS: Primary | ICD-10-CM

## 2024-07-19 DIAGNOSIS — B37.9 ANTIBIOTIC-INDUCED YEAST INFECTION: ICD-10-CM

## 2024-07-19 PROCEDURE — 99213 OFFICE O/P EST LOW 20 MIN: CPT | Performed by: NURSE PRACTITIONER

## 2024-07-19 RX ORDER — AMOXICILLIN 500 MG/1
500 CAPSULE ORAL 3 TIMES DAILY
Qty: 21 CAPSULE | Refills: 0 | Status: SHIPPED | OUTPATIENT
Start: 2024-07-19 | End: 2024-07-26

## 2024-07-19 RX ORDER — IBUPROFEN 600 MG/1
600 TABLET ORAL 2 TIMES DAILY PRN
COMMUNITY
Start: 2024-07-07

## 2024-07-19 RX ORDER — FLUCONAZOLE 150 MG/1
150 TABLET ORAL
Qty: 3 TABLET | Refills: 0 | Status: SHIPPED | OUTPATIENT
Start: 2024-07-19 | End: 2024-07-28

## 2024-07-19 SDOH — ECONOMIC STABILITY: HOUSING INSECURITY
IN THE LAST 12 MONTHS, WAS THERE A TIME WHEN YOU DID NOT HAVE A STEADY PLACE TO SLEEP OR SLEPT IN A SHELTER (INCLUDING NOW)?: NO

## 2024-07-19 SDOH — ECONOMIC STABILITY: INCOME INSECURITY: HOW HARD IS IT FOR YOU TO PAY FOR THE VERY BASICS LIKE FOOD, HOUSING, MEDICAL CARE, AND HEATING?: NOT HARD AT ALL

## 2024-07-19 SDOH — ECONOMIC STABILITY: FOOD INSECURITY: WITHIN THE PAST 12 MONTHS, YOU WORRIED THAT YOUR FOOD WOULD RUN OUT BEFORE YOU GOT MONEY TO BUY MORE.: NEVER TRUE

## 2024-07-19 SDOH — ECONOMIC STABILITY: FOOD INSECURITY: WITHIN THE PAST 12 MONTHS, THE FOOD YOU BOUGHT JUST DIDN'T LAST AND YOU DIDN'T HAVE MONEY TO GET MORE.: NEVER TRUE

## 2024-07-19 ASSESSMENT — PATIENT HEALTH QUESTIONNAIRE - PHQ9
1. LITTLE INTEREST OR PLEASURE IN DOING THINGS: NOT AT ALL
SUM OF ALL RESPONSES TO PHQ9 QUESTIONS 1 & 2: 0
2. FEELING DOWN, DEPRESSED OR HOPELESS: NOT AT ALL
SUM OF ALL RESPONSES TO PHQ QUESTIONS 1-9: 0

## 2024-07-19 ASSESSMENT — ENCOUNTER SYMPTOMS
COUGH: 0
SORE THROAT: 0
FACIAL SWELLING: 0
VOICE CHANGE: 0
NAUSEA: 0
TROUBLE SWALLOWING: 0

## 2024-07-19 NOTE — PROGRESS NOTES
Subjective  Elissa Gates, 39 y.o. female presents today with:  Chief Complaint   Patient presents with    Dental Pain     Tooth broken, thinks its infected, pain, starting to swell, making it difficult to eat,       HPI  Presents to  for a dental concern   Lower molar, posterior right side   Denies drainage from sites   Lessened appetite   Hydrating   Denies fever or chills   Tylenol 500mg & ibuprofen 600mg               Past Medical History:   Diagnosis Date    Arthritis       Past Surgical History:   Procedure Laterality Date     SECTION      4    CHOLECYSTECTOMY      TONSILLECTOMY       No family history on file.            Review of Systems   Constitutional:  Positive for appetite change. Negative for chills and fever.   HENT:  Negative for facial swelling, sore throat, trouble swallowing and voice change.    Respiratory:  Negative for cough.    Gastrointestinal:  Negative for nausea.   Musculoskeletal:  Negative for neck stiffness.   Neurological:  Positive for headaches.         PMH, Surgical Hx, Family Hx, and Social Hx reviewed and updated.              Objective  Vitals:    24 1511   BP: 124/82   Pulse: 94   Temp: 98.7 °F (37.1 °C)   SpO2: 100%   Weight: 84.1 kg (185 lb 6.4 oz)   Height: 1.727 m (5' 8\")     BP Readings from Last 3 Encounters:   24 124/82   22 92/66   19 (!) 141/73     Wt Readings from Last 3 Encounters:   24 84.1 kg (185 lb 6.4 oz)   22 93.9 kg (207 lb)   19 92.1 kg (203 lb)             Physical Exam  Vitals reviewed.   Constitutional:       General: She is awake. She is not in acute distress.     Appearance: She is not toxic-appearing.   HENT:      Right Ear: External ear normal.      Left Ear: External ear normal.      Nose: Nose normal.      Mouth/Throat:      Lips: Pink.      Dentition: Abnormal dentition. Dental tenderness, gingival swelling, dental caries and dental abscesses present.     Eyes:      General: Lids are normal.

## 2024-08-21 ENCOUNTER — HOSPITAL ENCOUNTER (EMERGENCY)
Age: 39
Discharge: HOME | End: 2024-08-21
Attending: EMERGENCY MEDICINE
Payer: COMMERCIAL

## 2024-08-21 VITALS
OXYGEN SATURATION: 97 % | HEART RATE: 87 BPM | HEIGHT: 68 IN | WEIGHT: 190 LBS | RESPIRATION RATE: 20 BRPM | SYSTOLIC BLOOD PRESSURE: 143 MMHG | TEMPERATURE: 97.7 F | DIASTOLIC BLOOD PRESSURE: 85 MMHG | BODY MASS INDEX: 28.79 KG/M2

## 2024-08-21 DIAGNOSIS — K08.89 PAIN, DENTAL: Primary | ICD-10-CM

## 2024-08-21 DIAGNOSIS — K04.7 DENTAL INFECTION: ICD-10-CM

## 2024-08-21 PROCEDURE — 99283 EMERGENCY DEPT VISIT LOW MDM: CPT

## 2024-08-21 PROCEDURE — 6370000000 HC RX 637 (ALT 250 FOR IP): Performed by: EMERGENCY MEDICINE

## 2024-08-21 RX ORDER — IBUPROFEN 600 MG/1
600 TABLET ORAL ONCE
Status: COMPLETED | OUTPATIENT
Start: 2024-08-21 | End: 2024-08-21

## 2024-08-21 RX ORDER — CLINDAMYCIN HYDROCHLORIDE 300 MG/1
300 CAPSULE ORAL 3 TIMES DAILY
Qty: 30 CAPSULE | Refills: 0 | Status: SHIPPED | OUTPATIENT
Start: 2024-08-21 | End: 2024-08-31

## 2024-08-21 RX ORDER — IBUPROFEN 600 MG/1
600 TABLET ORAL EVERY 8 HOURS PRN
Qty: 30 TABLET | Refills: 0 | Status: SHIPPED | OUTPATIENT
Start: 2024-08-21

## 2024-08-21 RX ORDER — CLINDAMYCIN HYDROCHLORIDE 150 MG/1
300 CAPSULE ORAL ONCE
Status: COMPLETED | OUTPATIENT
Start: 2024-08-21 | End: 2024-08-21

## 2024-08-21 RX ADMIN — CLINDAMYCIN HYDROCHLORIDE 300 MG: 150 CAPSULE ORAL at 10:52

## 2024-08-21 RX ADMIN — IBUPROFEN 600 MG: 600 TABLET, FILM COATED ORAL at 10:52

## 2024-08-21 ASSESSMENT — ENCOUNTER SYMPTOMS
STRIDOR: 0
EYE REDNESS: 0
CONSTIPATION: 0
CHEST TIGHTNESS: 0
DIARRHEA: 0
ABDOMINAL PAIN: 0
FACIAL SWELLING: 0
SHORTNESS OF BREATH: 0
SORE THROAT: 0
VOICE CHANGE: 0
COUGH: 0
BACK PAIN: 0
SINUS PRESSURE: 0
EYE PAIN: 0
EYE DISCHARGE: 0
CHOKING: 0
BLOOD IN STOOL: 0
TROUBLE SWALLOWING: 0
WHEEZING: 0
VOMITING: 0

## 2024-08-21 ASSESSMENT — PAIN DESCRIPTION - PAIN TYPE: TYPE: ACUTE PAIN

## 2024-08-21 ASSESSMENT — LIFESTYLE VARIABLES
HOW OFTEN DO YOU HAVE A DRINK CONTAINING ALCOHOL: NEVER
HOW MANY STANDARD DRINKS CONTAINING ALCOHOL DO YOU HAVE ON A TYPICAL DAY: PATIENT DOES NOT DRINK

## 2024-08-21 ASSESSMENT — PAIN DESCRIPTION - ORIENTATION: ORIENTATION: LEFT

## 2024-08-21 ASSESSMENT — PAIN SCALES - GENERAL
PAINLEVEL_OUTOF10: 4
PAINLEVEL_OUTOF10: 2

## 2024-08-21 ASSESSMENT — PAIN DESCRIPTION - LOCATION: LOCATION: TEETH

## 2024-08-21 ASSESSMENT — PAIN DESCRIPTION - FREQUENCY: FREQUENCY: CONTINUOUS

## 2024-08-21 ASSESSMENT — PAIN - FUNCTIONAL ASSESSMENT: PAIN_FUNCTIONAL_ASSESSMENT: 0-10

## 2024-08-21 ASSESSMENT — PAIN DESCRIPTION - DESCRIPTORS: DESCRIPTORS: ACHING

## 2024-08-21 NOTE — ED PROVIDER NOTES
well suspicion of dental infection  Eyes:      General:         Right eye: No discharge.         Left eye: No discharge.      Extraocular Movements: Extraocular movements intact.   Neck:      Vascular: No carotid bruit.   Cardiovascular:      Rate and Rhythm: Normal rate and regular rhythm.      Heart sounds: Normal heart sounds. No murmur heard.     No friction rub. No gallop.   Pulmonary:      Effort: No respiratory distress.      Breath sounds: Normal breath sounds. No stridor. No wheezing or rhonchi.   Chest:      Chest wall: No tenderness.   Abdominal:      General: Bowel sounds are normal. There is no distension.      Palpations: Abdomen is soft. There is no mass.      Tenderness: There is no abdominal tenderness. There is no guarding or rebound.      Hernia: No hernia is present.   Musculoskeletal:         General: No swelling, tenderness, deformity or signs of injury. Normal range of motion.      Cervical back: Neck supple. No rigidity or tenderness.      Right lower leg: No edema.      Left lower leg: No edema.   Lymphadenopathy:      Cervical: No cervical adenopathy.   Skin:     General: Skin is warm.      Coloration: Skin is not jaundiced.      Findings: No bruising, erythema, lesion or rash.   Neurological:      Mental Status: She is alert and oriented to person, place, and time.      Cranial Nerves: No cranial nerve deficit.      Sensory: No sensory deficit.      Motor: No weakness or abnormal muscle tone.      Coordination: Coordination normal.      Gait: Gait normal.      Deep Tendon Reflexes: Reflexes normal.   Psychiatric:         Behavior: Behavior normal.         Thought Content: Thought content normal.         DIAGNOSTIC RESULTS     EKG: All EKG's are interpreted by the Emergency Department Physician who either signs or Co-signsthis chart in the absence of a cardiologist.        RADIOLOGY:   Non-plain filmimages such as CT, Ultrasound and MRI are read by the radiologist. Plain radiographic images

## 2024-11-12 ENCOUNTER — APPOINTMENT (OUTPATIENT)
Dept: CARDIOLOGY | Facility: HOSPITAL | Age: 39
End: 2024-11-12
Payer: COMMERCIAL

## 2024-11-12 ENCOUNTER — APPOINTMENT (OUTPATIENT)
Dept: RADIOLOGY | Facility: HOSPITAL | Age: 39
End: 2024-11-12
Payer: COMMERCIAL

## 2024-11-12 ENCOUNTER — HOSPITAL ENCOUNTER (EMERGENCY)
Facility: HOSPITAL | Age: 39
Discharge: HOME | End: 2024-11-12
Payer: COMMERCIAL

## 2024-11-12 VITALS
TEMPERATURE: 97.7 F | BODY MASS INDEX: 28.93 KG/M2 | RESPIRATION RATE: 18 BRPM | HEIGHT: 66 IN | OXYGEN SATURATION: 100 % | SYSTOLIC BLOOD PRESSURE: 120 MMHG | DIASTOLIC BLOOD PRESSURE: 63 MMHG | HEART RATE: 89 BPM | WEIGHT: 180 LBS

## 2024-11-12 DIAGNOSIS — R55 NEAR SYNCOPE: Primary | ICD-10-CM

## 2024-11-12 LAB
ALBUMIN SERPL BCP-MCNC: 4.3 G/DL (ref 3.4–5)
ALP SERPL-CCNC: 63 U/L (ref 33–110)
ALT SERPL W P-5'-P-CCNC: 20 U/L (ref 7–45)
ANION GAP SERPL CALC-SCNC: 11 MMOL/L (ref 10–20)
AST SERPL W P-5'-P-CCNC: 18 U/L (ref 9–39)
BASOPHILS # BLD AUTO: 0.07 X10*3/UL (ref 0–0.1)
BASOPHILS NFR BLD AUTO: 0.7 %
BILIRUB SERPL-MCNC: 0.2 MG/DL (ref 0–1.2)
BNP SERPL-MCNC: 31 PG/ML (ref 0–99)
BUN SERPL-MCNC: 9 MG/DL (ref 6–23)
CALCIUM SERPL-MCNC: 8.9 MG/DL (ref 8.6–10.3)
CARDIAC TROPONIN I PNL SERPL HS: 3 NG/L (ref 0–13)
CARDIAC TROPONIN I PNL SERPL HS: 4 NG/L (ref 0–13)
CHLORIDE SERPL-SCNC: 107 MMOL/L (ref 98–107)
CO2 SERPL-SCNC: 27 MMOL/L (ref 21–32)
CREAT SERPL-MCNC: 0.72 MG/DL (ref 0.5–1.05)
D DIMER PPP FEU-MCNC: 583 NG/ML FEU
EGFRCR SERPLBLD CKD-EPI 2021: >90 ML/MIN/1.73M*2
EOSINOPHIL # BLD AUTO: 0.15 X10*3/UL (ref 0–0.7)
EOSINOPHIL NFR BLD AUTO: 1.5 %
ERYTHROCYTE [DISTWIDTH] IN BLOOD BY AUTOMATED COUNT: 13.2 % (ref 11.5–14.5)
FLUAV RNA RESP QL NAA+PROBE: NOT DETECTED
FLUBV RNA RESP QL NAA+PROBE: NOT DETECTED
GLUCOSE SERPL-MCNC: 111 MG/DL (ref 74–99)
HCT VFR BLD AUTO: 41.6 % (ref 36–46)
HGB BLD-MCNC: 13.5 G/DL (ref 12–16)
IMM GRANULOCYTES # BLD AUTO: 0.03 X10*3/UL (ref 0–0.7)
IMM GRANULOCYTES NFR BLD AUTO: 0.3 % (ref 0–0.9)
INR PPP: 0.9 (ref 0.9–1.1)
LYMPHOCYTES # BLD AUTO: 2.96 X10*3/UL (ref 1.2–4.8)
LYMPHOCYTES NFR BLD AUTO: 29.9 %
MAGNESIUM SERPL-MCNC: 1.78 MG/DL (ref 1.6–2.4)
MCH RBC QN AUTO: 28.3 PG (ref 26–34)
MCHC RBC AUTO-ENTMCNC: 32.5 G/DL (ref 32–36)
MCV RBC AUTO: 87 FL (ref 80–100)
MONOCYTES # BLD AUTO: 0.59 X10*3/UL (ref 0.1–1)
MONOCYTES NFR BLD AUTO: 6 %
NEUTROPHILS # BLD AUTO: 6.09 X10*3/UL (ref 1.2–7.7)
NEUTROPHILS NFR BLD AUTO: 61.6 %
NRBC BLD-RTO: 0 /100 WBCS (ref 0–0)
PLATELET # BLD AUTO: 327 X10*3/UL (ref 150–450)
POTASSIUM SERPL-SCNC: 3.7 MMOL/L (ref 3.5–5.3)
PROT SERPL-MCNC: 7.3 G/DL (ref 6.4–8.2)
PROTHROMBIN TIME: 10.5 SECONDS (ref 9.8–12.8)
RBC # BLD AUTO: 4.77 X10*6/UL (ref 4–5.2)
SARS-COV-2 RNA RESP QL NAA+PROBE: NOT DETECTED
SODIUM SERPL-SCNC: 141 MMOL/L (ref 136–145)
WBC # BLD AUTO: 9.9 X10*3/UL (ref 4.4–11.3)

## 2024-11-12 PROCEDURE — 93005 ELECTROCARDIOGRAM TRACING: CPT

## 2024-11-12 PROCEDURE — 83735 ASSAY OF MAGNESIUM: CPT | Performed by: REGISTERED NURSE

## 2024-11-12 PROCEDURE — 71275 CT ANGIOGRAPHY CHEST: CPT | Mod: FOREIGN READ | Performed by: RADIOLOGY

## 2024-11-12 PROCEDURE — 36415 COLL VENOUS BLD VENIPUNCTURE: CPT | Performed by: REGISTERED NURSE

## 2024-11-12 PROCEDURE — 87636 SARSCOV2 & INF A&B AMP PRB: CPT | Performed by: REGISTERED NURSE

## 2024-11-12 PROCEDURE — 84484 ASSAY OF TROPONIN QUANT: CPT | Performed by: REGISTERED NURSE

## 2024-11-12 PROCEDURE — 71275 CT ANGIOGRAPHY CHEST: CPT

## 2024-11-12 PROCEDURE — 99285 EMERGENCY DEPT VISIT HI MDM: CPT | Mod: 25

## 2024-11-12 PROCEDURE — 80053 COMPREHEN METABOLIC PANEL: CPT | Performed by: REGISTERED NURSE

## 2024-11-12 PROCEDURE — 71046 X-RAY EXAM CHEST 2 VIEWS: CPT | Mod: FOREIGN READ | Performed by: RADIOLOGY

## 2024-11-12 PROCEDURE — 2550000001 HC RX 255 CONTRASTS: Performed by: REGISTERED NURSE

## 2024-11-12 PROCEDURE — 71046 X-RAY EXAM CHEST 2 VIEWS: CPT

## 2024-11-12 PROCEDURE — 85379 FIBRIN DEGRADATION QUANT: CPT | Performed by: REGISTERED NURSE

## 2024-11-12 PROCEDURE — 83880 ASSAY OF NATRIURETIC PEPTIDE: CPT | Performed by: REGISTERED NURSE

## 2024-11-12 PROCEDURE — 85025 COMPLETE CBC W/AUTO DIFF WBC: CPT | Performed by: REGISTERED NURSE

## 2024-11-12 PROCEDURE — 85610 PROTHROMBIN TIME: CPT | Performed by: REGISTERED NURSE

## 2024-11-12 ASSESSMENT — PAIN SCALES - GENERAL
PAINLEVEL_OUTOF10: 3
PAINLEVEL_OUTOF10: 0 - NO PAIN

## 2024-11-12 ASSESSMENT — COLUMBIA-SUICIDE SEVERITY RATING SCALE - C-SSRS
1. IN THE PAST MONTH, HAVE YOU WISHED YOU WERE DEAD OR WISHED YOU COULD GO TO SLEEP AND NOT WAKE UP?: NO
2. HAVE YOU ACTUALLY HAD ANY THOUGHTS OF KILLING YOURSELF?: NO
6. HAVE YOU EVER DONE ANYTHING, STARTED TO DO ANYTHING, OR PREPARED TO DO ANYTHING TO END YOUR LIFE?: NO

## 2024-11-12 ASSESSMENT — LIFESTYLE VARIABLES
EVER HAD A DRINK FIRST THING IN THE MORNING TO STEADY YOUR NERVES TO GET RID OF A HANGOVER: NO
HAVE PEOPLE ANNOYED YOU BY CRITICIZING YOUR DRINKING: NO
EVER FELT BAD OR GUILTY ABOUT YOUR DRINKING: NO
HAVE YOU EVER FELT YOU SHOULD CUT DOWN ON YOUR DRINKING: NO
TOTAL SCORE: 0

## 2024-11-12 ASSESSMENT — PAIN - FUNCTIONAL ASSESSMENT: PAIN_FUNCTIONAL_ASSESSMENT: 0-10

## 2024-11-12 NOTE — ED PROVIDER NOTES
HPI   Chief Complaint   Patient presents with    Dizziness     Pt states she was at work and walking became dizzy and her heart rate went to 115     39-year-old female with past medical history significant for tobacco use presents emergency department today for evaluation of dizziness and lightheadedness.  Patient tells me she was at work today when she was ambulating and suddenly feltdizzy and her heart was racing.  She tells me that her heart rate went up to 115.  Patient arrives to the ED with complaint of still feeling dizzy.  Patient tells me it feels that she is going to pass out denies feeling like a spinning sensation.  Patient denies fever or chills.  Patient denies headache, abdominal pain, nausea vomiting or diarrhea.  Patient has no other complaints on arrival.      History provided by:  Patient          Patient History   Past Medical History:   Diagnosis Date    Bursitis of right shoulder 2017    Acute bursitis of right shoulder    Otalgia, unspecified ear 2017    Otalgia, unspecified laterality    Other enthesopathies, not elsewhere classified 2017    Tendinitis of right shoulder    Otorrhea, unspecified ear     Otorrhea, unspecified laterality    Personal history of other diseases of the nervous system and sense organs 2017    History of hearing loss    Personal history of other specified conditions 2017    History of headache     Past Surgical History:   Procedure Laterality Date     SECTION, CLASSIC  2017     Section    GALLBLADDER SURGERY  2017    Gallbladder Surgery    OTHER SURGICAL HISTORY  2020    Cholecystectomy    OTHER SURGICAL HISTORY  2020     section     Family History   Problem Relation Name Age of Onset    Other (deafness or hearing loss) Brother       Social History     Tobacco Use    Smoking status: Every Day     Types: Cigarettes    Smokeless tobacco: Never   Substance Use Topics    Alcohol use: Never     Drug use: Never       Physical Exam   ED Triage Vitals [11/12/24 1212]   Temperature Heart Rate Respirations BP   36.5 °C (97.7 °F) (!) 104 16 149/84      Pulse Ox Temp src Heart Rate Source Patient Position   100 % -- -- --      BP Location FiO2 (%)     -- --       Physical Exam  Vitals and nursing note reviewed.   Constitutional:       Appearance: Normal appearance.   HENT:      Head: Normocephalic.      Mouth/Throat:      Mouth: Mucous membranes are moist.   Eyes:      Extraocular Movements:      Right eye: Normal extraocular motion and no nystagmus.      Left eye: Normal extraocular motion and no nystagmus.   Cardiovascular:      Rate and Rhythm: Normal rate and regular rhythm.      Pulses: Normal pulses.      Heart sounds: Normal heart sounds.   Pulmonary:      Effort: Pulmonary effort is normal.      Breath sounds: Normal breath sounds.   Abdominal:      Palpations: Abdomen is soft.   Skin:     General: Skin is warm and dry.      Capillary Refill: Capillary refill takes less than 2 seconds.   Neurological:      General: No focal deficit present.      Mental Status: She is alert and oriented to person, place, and time.   Psychiatric:         Mood and Affect: Mood normal.         Behavior: Behavior normal.           ED Course & MDM   Diagnoses as of 11/12/24 1654   Near syncope                 No data recorded     [place] Coma Scale Score: 15 (11/12/24 1215 : Julia Walker RN)                           Medical Decision Making    Patient seen exam emergency department; patient is healthy nontoxic in appearance and does not appear to be in acute distress.  Patient is alert and oriented x 3.  Patient is neurologically intact without any focal deficits.  Patient has normal ocular movement.  Patient without nystagmus noted.  PERRLA.  Patient's lung sounds are clear bilaterally without any adventitious noise.  Heart regular rate and rhythm without a murmur appreciated.  Skin is warm and dry without any  diaphoresis noted.  Abdomen soft round and nontender.    Patient is tachycardic and was dizzy therefore we will order D-dimer to evaluate the need for PE study.  Will also order chest x-ray, EKG and troponin to rule out acute ACS.  Will also order COVID and flu swabs, BNP, CBC, mag, PT/INR, and CMP.    EKG at 13:38 with ventricular rate of 102, as interpreted by me, shows a tachycardia normal axis, normal intervals. And normal ST and T wave pattern with no evidence of acute ischemia or other acute findings    Patient's D-dimer came back elevated at 583 therefore PE study was ordered.    Repeat EKG at 15:57 with ventricular rate of 90, as interpreted by me, shows a normal rate and rhythm, normal axis, normal intervals. And normal ST and T wave pattern with no evidence of acute ischemia or other acute findings      Patient's micro swabs are negative.  Patient's lab work is unremarkable.  Chest x-ray without evidence of acute cardiopulmonary process.  CT angio of the chest is negative for PE.    Upon reevaluation patient tells me she is feeling better post administration of medications.  Patient I discussed that while I was unable to figure out the exact reason why she was feeling dizzy we did rule out cardiac source or pulmonary embolism.  I did discuss with her that I think she should follow-up with a cardiologist to discuss the possibility of wearing a Holter monitor as she did feel palpitations today.  Patient does agree with this idea however she declined my offer of scheduling appointment here as she tells me she needs to see a provider at [facility].  Patient and I discussed return parameters which she verbalized understanding of.  All patient's questions and concerns were addressed prior to discharge.  Patient discharged home in stable condition.        Labs Reviewed   COMPREHENSIVE METABOLIC PANEL - Abnormal       Result Value    Glucose 111 (*)     Sodium 141      Potassium 3.7      Chloride 107      Bicarbonate  27      Anion Gap 11      Urea Nitrogen 9      Creatinine 0.72      eGFR >90      Calcium 8.9      Albumin 4.3      Alkaline Phosphatase 63      Total Protein 7.3      AST 18      Bilirubin, Total 0.2      ALT 20     D-DIMER, VTE EXCLUSION - Abnormal    D-Dimer, Quantitative VTE Exclusion 583 (*)     Narrative:     The VTE Exclusion D-Dimer assay is reported in ng/mL Fibrinogen Equivalent Units (FEU).    Per 's instructions for use, a value of less than 500 ng/mL (FEU) may help to exclude DVT or PE in outpatients when the assay is used with a clinical pretest probability assessment.(AEMR must utilize and document eCalc 'Wells Score Deep Vein Thrombosis Risk' for DVT exclusion only. Emergency Department should utilize  Guidelines for Emergency Department Use of the VTE Exclusion D-Dimer and Clinical Pretest probability assessment model for DVT or PE exclusion.)   MAGNESIUM - Normal    Magnesium 1.78     PROTIME-INR - Normal    Protime 10.5      INR 0.9     B-TYPE NATRIURETIC PEPTIDE - Normal    BNP 31      Narrative:        <100 pg/mL - Heart failure unlikely  100-299 pg/mL - Intermediate probability of acute heart                  failure exacerbation. Correlate with clinical                  context and patient history.    >=300 pg/mL - Heart Failure likely. Correlate with clinical                  context and patient history.    BNP testing is performed using different testing methodology at [facility] than at other [facility]. Direct result comparisons should only be made within the same method.      SARS-COV-2 PCR - Normal    Coronavirus 2019, PCR Not Detected      Narrative:     This assay has received FDA Emergency Use Authorization (EUA) and is only authorized for the duration of time that circumstances exist to justify the authorization of the emergency use of in vitro diagnostic tests for the detection of SARS-CoV-2 virus and/or diagnosis of COVID-19 infection under section  564(b)(1) of the Act, 21 U.S.C. 360bbb-3(b)(1). This assay is an in vitro diagnostic nucleic acid amplification test for the qualitative detection of SARS-CoV-2 from nasopharyngeal specimens and has been validated for use at [facility]. Negative results do not preclude COVID-19 infections and should not be used as the sole basis for diagnosis, treatment, or other management decisions.     INFLUENZA A AND B PCR - Normal    Flu A Result Not Detected      Flu B Result Not Detected      Narrative:     This assay is an in vitro diagnostic multiplex nucleic acid amplification test for the detection and discrimination of Influenza A & B from nasopharyngeal specimens, and has been validated for use at [facility]. Negative results do not preclude Influenza A/B infections, and should not be used as the sole basis for diagnosis, treatment, or other management decisions. If Influenza A/B and RSV PCR results are negative, testing for Parainfluenza virus, Adenovirus and Metapneumovirus is routinely performed for [facility] pediatric oncology and intensive care inpatients, and is available on other patients by placing an add-on request.   SERIAL TROPONIN-INITIAL - Normal    Troponin I, High Sensitivity 4      Narrative:     Less than 99th percentile of normal range cutoff-  Female and children under 18 years old <14 ng/L; Male <21 ng/L: Negative  Repeat testing should be performed if clinically indicated.     Female and children under 18 years old 14-50 ng/L; Male 21-50 ng/L:  Consistent with possible cardiac damage and possible increased clinical   risk. Serial measurements may help to assess extent of myocardial damage.     >50 ng/L: Consistent with cardiac damage, increased clinical risk and  myocardial infarction. Serial measurements may help assess extent of   myocardial damage.      NOTE: Children less than 1 year old may have higher baseline troponin   levels and results should be  interpreted in conjunction with the overall   clinical context.     NOTE: Troponin I testing is performed using a different   testing methodology at [facility] than at other   [facility]. Direct result comparisons should only   be made within the same method.   SERIAL TROPONIN, 1 HOUR - Normal    Troponin I, High Sensitivity 3      Narrative:     Less than 99th percentile of normal range cutoff-  Female and children under 18 years old <14 ng/L; Male <21 ng/L: Negative  Repeat testing should be performed if clinically indicated.     Female and children under 18 years old 14-50 ng/L; Male 21-50 ng/L:  Consistent with possible cardiac damage and possible increased clinical   risk. Serial measurements may help to assess extent of myocardial damage.     >50 ng/L: Consistent with cardiac damage, increased clinical risk and  myocardial infarction. Serial measurements may help assess extent of   myocardial damage.      NOTE: Children less than 1 year old may have higher baseline troponin   levels and results should be interpreted in conjunction with the overall   clinical context.     NOTE: Troponin I testing is performed using a different   testing methodology at [facility] than at other   [facility]. Direct result comparisons should only   be made within the same method.   TROPONIN SERIES- (INITIAL, 1 HR)    Narrative:     The following orders were created for panel order Troponin I Series, High Sensitivity (0, 1 HR).  Procedure                               Abnormality         Status                     ---------                               -----------         ------                     Troponin I, High Sensiti...[[id number]]  Normal              Final result               Troponin, High Sensitivi...[[id number]]  Normal              Final result                 Please view results for these tests on the individual orders.   CBC WITH AUTO DIFFERENTIAL    WBC 9.9      nRBC 0.0       RBC 4.77      Hemoglobin 13.5      Hematocrit 41.6      MCV 87      MCH 28.3      MCHC 32.5      RDW 13.2      Platelets 327      Neutrophils % 61.6      Immature Granulocytes %, Automated 0.3      Lymphocytes % 29.9      Monocytes % 6.0      Eosinophils % 1.5      Basophils % 0.7      Neutrophils Absolute 6.09      Immature Granulocytes Absolute, Automated 0.03      Lymphocytes Absolute 2.96      Monocytes Absolute 0.59      Eosinophils Absolute 0.15      Basophils Absolute 0.07         CT angio chest for pulmonary embolism   Final Result   No evidence of pulmonary embolism or acute thoracic aortic pathology.   No pulmonary consolidation.   Signed by Cory Gutierrez MD      XR chest 2 views   Final Result   No regions of airspace consolidation.  Normal cardiac size.   Signed by Isidoro Francois MD            Procedure  Procedures     Usha Mcdonald, APRN-CNP  11/12/24 8282

## 2024-11-16 LAB
ATRIAL RATE: 102 BPM
ATRIAL RATE: 90 BPM
P AXIS: 62 DEGREES
P AXIS: 65 DEGREES
P OFFSET: 197 MS
P OFFSET: 205 MS
P ONSET: 135 MS
P ONSET: 140 MS
PR INTERVAL: 164 MS
PR INTERVAL: 170 MS
Q ONSET: 220 MS
Q ONSET: 222 MS
QRS COUNT: 15 BEATS
QRS COUNT: 17 BEATS
QRS DURATION: 78 MS
QRS DURATION: 80 MS
QT INTERVAL: 368 MS
QT INTERVAL: 390 MS
QTC CALCULATION(BAZETT): 477 MS
QTC CALCULATION(BAZETT): 479 MS
QTC FREDERICIA: 439 MS
QTC FREDERICIA: 446 MS
R AXIS: 43 DEGREES
R AXIS: 47 DEGREES
T AXIS: 42 DEGREES
T AXIS: 42 DEGREES
T OFFSET: 406 MS
T OFFSET: 415 MS
VENTRICULAR RATE: 102 BPM
VENTRICULAR RATE: 90 BPM

## 2025-03-30 ENCOUNTER — HOSPITAL ENCOUNTER (EMERGENCY)
Age: 40
Discharge: HOME | End: 2025-03-30
Payer: COMMERCIAL

## 2025-03-30 VITALS
OXYGEN SATURATION: 99 % | HEART RATE: 88 BPM | HEIGHT: 66 IN | DIASTOLIC BLOOD PRESSURE: 76 MMHG | BODY MASS INDEX: 28.93 KG/M2 | TEMPERATURE: 98.2 F | SYSTOLIC BLOOD PRESSURE: 132 MMHG | WEIGHT: 180 LBS | RESPIRATION RATE: 18 BRPM

## 2025-03-30 DIAGNOSIS — K04.7 DENTAL INFECTION: Primary | ICD-10-CM

## 2025-03-30 PROCEDURE — 99283 EMERGENCY DEPT VISIT LOW MDM: CPT

## 2025-03-30 RX ORDER — IBUPROFEN 600 MG/1
600 TABLET, FILM COATED ORAL 3 TIMES DAILY PRN
Qty: 30 TABLET | Refills: 0 | Status: SHIPPED | OUTPATIENT
Start: 2025-03-30

## 2025-03-30 RX ORDER — CLINDAMYCIN HYDROCHLORIDE 300 MG/1
300 CAPSULE ORAL 3 TIMES DAILY
Qty: 30 CAPSULE | Refills: 0 | Status: SHIPPED | OUTPATIENT
Start: 2025-03-30 | End: 2025-04-09

## 2025-03-30 RX ORDER — LIDOCAINE HYDROCHLORIDE 20 MG/ML
1 SOLUTION OROPHARYNGEAL PRN
Qty: 100 ML | Refills: 1 | Status: SHIPPED | OUTPATIENT
Start: 2025-03-30

## 2025-03-30 ASSESSMENT — PAIN DESCRIPTION - LOCATION: LOCATION: TEETH

## 2025-03-30 ASSESSMENT — PAIN DESCRIPTION - PAIN TYPE: TYPE: ACUTE PAIN

## 2025-03-30 ASSESSMENT — PAIN SCALES - GENERAL: PAINLEVEL_OUTOF10: 10

## 2025-03-30 ASSESSMENT — PAIN - FUNCTIONAL ASSESSMENT: PAIN_FUNCTIONAL_ASSESSMENT: 0-10

## 2025-03-30 ASSESSMENT — PAIN DESCRIPTION - FREQUENCY: FREQUENCY: CONTINUOUS

## 2025-03-30 ASSESSMENT — PAIN DESCRIPTION - ORIENTATION: ORIENTATION: LEFT;UPPER

## 2025-03-30 ASSESSMENT — PAIN DESCRIPTION - DESCRIPTORS: DESCRIPTORS: PATIENT UNABLE TO DESCRIBE

## 2025-03-30 NOTE — ED NOTES
Pt is given dc instructions and informed to  three e scripts from Helen Curry.  Pt voiced understanding of dc instructions without further questions.

## 2025-03-30 NOTE — ED PROVIDER NOTES
abscesses present.      Pharynx: Oropharynx is clear.     Eyes:      Conjunctiva/sclera: Conjunctivae normal.   Cardiovascular:      Rate and Rhythm: Normal rate and regular rhythm.   Pulmonary:      Effort: Pulmonary effort is normal.   Abdominal:      General: Abdomen is flat.   Musculoskeletal:      Cervical back: Normal range of motion and neck supple.   Lymphadenopathy:      Cervical: No cervical adenopathy.   Skin:     General: Skin is warm and dry.      Capillary Refill: Capillary refill takes less than 2 seconds.   Neurological:      Mental Status: She is alert and oriented to person, place, and time. Mental status is at baseline.           All other labs were within normal range or not returned as of this dictation.    EMERGENCY DEPARTMENT COURSE and DIFFERENTIALDIAGNOSIS/MDM:   Vitals:    Vitals:    03/30/25 1454   BP: 132/76   Pulse: 88   Resp: 18   Temp: 98.2 °F (36.8 °C)   TempSrc: Oral   SpO2: 99%   Weight: 81.6 kg (180 lb)   Height: 1.676 m (5' 6\")            Elissa Gates is a 39 y.o. female who presents to the Emergency Department with dental pain worsening over the last 2 days infected tooth left upper jaw.  On examination patient has some mild swelling to the left cheek.  Neck is supple no masses.  Oral examination broken tooth is noted appears to be infected left upper, there is moderate gingival swelling to the surrounding area.  No current abscess though patient reports she did have discharge from this tooth a few days ago.  She took ibuprofen prior to arrival.  Orders for clindamycin 10-day course, viscous lidocaine topical and ibuprofen for pain advised patient follow-up with dental care provider as soon as possible.      LABS:  Labs Reviewed - No data to display      PROCEDURES:  Unless otherwise noted below, none     Procedures    My attending is: Dr Cerda      FINAL IMPRESSION      1. Dental infection          DISPOSITION/PLAN   DISPOSITION Decision To Discharge 03/30/2025 03:07:08 PM

## 2025-03-31 ENCOUNTER — CARE COORDINATION (OUTPATIENT)
Dept: OTHER | Facility: CLINIC | Age: 40
End: 2025-03-31

## 2025-03-31 ENCOUNTER — HOSPITAL ENCOUNTER (EMERGENCY)
Facility: HOSPITAL | Age: 40
Discharge: HOME | End: 2025-03-31
Payer: COMMERCIAL

## 2025-03-31 ENCOUNTER — APPOINTMENT (OUTPATIENT)
Dept: RADIOLOGY | Facility: HOSPITAL | Age: 40
End: 2025-03-31
Payer: COMMERCIAL

## 2025-03-31 VITALS
DIASTOLIC BLOOD PRESSURE: 82 MMHG | HEART RATE: 94 BPM | HEIGHT: 68 IN | BODY MASS INDEX: 27.28 KG/M2 | TEMPERATURE: 97.5 F | WEIGHT: 180 LBS | RESPIRATION RATE: 20 BRPM | OXYGEN SATURATION: 98 % | SYSTOLIC BLOOD PRESSURE: 155 MMHG

## 2025-03-31 DIAGNOSIS — K04.7 DENTAL ABSCESS: Primary | ICD-10-CM

## 2025-03-31 LAB
ANION GAP SERPL CALC-SCNC: 11 MMOL/L (ref 10–20)
BASOPHILS # BLD AUTO: 0.07 X10*3/UL (ref 0–0.1)
BASOPHILS NFR BLD AUTO: 0.5 %
BUN SERPL-MCNC: 8 MG/DL (ref 6–23)
CALCIUM SERPL-MCNC: 9.1 MG/DL (ref 8.6–10.3)
CHLORIDE SERPL-SCNC: 104 MMOL/L (ref 98–107)
CO2 SERPL-SCNC: 27 MMOL/L (ref 21–32)
CREAT SERPL-MCNC: 0.6 MG/DL (ref 0.5–1.05)
EGFRCR SERPLBLD CKD-EPI 2021: >90 ML/MIN/1.73M*2
EOSINOPHIL # BLD AUTO: 0.38 X10*3/UL (ref 0–0.7)
EOSINOPHIL NFR BLD AUTO: 2.8 %
ERYTHROCYTE [DISTWIDTH] IN BLOOD BY AUTOMATED COUNT: 14.1 % (ref 11.5–14.5)
GLUCOSE SERPL-MCNC: 89 MG/DL (ref 74–99)
HCT VFR BLD AUTO: 42.8 % (ref 36–46)
HGB BLD-MCNC: 14.4 G/DL (ref 12–16)
IMM GRANULOCYTES # BLD AUTO: 0.04 X10*3/UL (ref 0–0.7)
IMM GRANULOCYTES NFR BLD AUTO: 0.3 % (ref 0–0.9)
LACTATE SERPL-SCNC: 0.8 MMOL/L (ref 0.4–2)
LYMPHOCYTES # BLD AUTO: 4.25 X10*3/UL (ref 1.2–4.8)
LYMPHOCYTES NFR BLD AUTO: 30.8 %
MCH RBC QN AUTO: 29.3 PG (ref 26–34)
MCHC RBC AUTO-ENTMCNC: 33.6 G/DL (ref 32–36)
MCV RBC AUTO: 87 FL (ref 80–100)
MONOCYTES # BLD AUTO: 1.04 X10*3/UL (ref 0.1–1)
MONOCYTES NFR BLD AUTO: 7.5 %
NEUTROPHILS # BLD AUTO: 8.01 X10*3/UL (ref 1.2–7.7)
NEUTROPHILS NFR BLD AUTO: 58.1 %
NRBC BLD-RTO: 0 /100 WBCS (ref 0–0)
PLATELET # BLD AUTO: 349 X10*3/UL (ref 150–450)
POTASSIUM SERPL-SCNC: 3.5 MMOL/L (ref 3.5–5.3)
RBC # BLD AUTO: 4.91 X10*6/UL (ref 4–5.2)
SODIUM SERPL-SCNC: 138 MMOL/L (ref 136–145)
WBC # BLD AUTO: 13.8 X10*3/UL (ref 4.4–11.3)

## 2025-03-31 PROCEDURE — 36415 COLL VENOUS BLD VENIPUNCTURE: CPT | Performed by: PHYSICIAN ASSISTANT

## 2025-03-31 PROCEDURE — 70486 CT MAXILLOFACIAL W/O DYE: CPT | Performed by: RADIOLOGY

## 2025-03-31 PROCEDURE — 70487 CT MAXILLOFACIAL W/DYE: CPT

## 2025-03-31 PROCEDURE — 99285 EMERGENCY DEPT VISIT HI MDM: CPT | Mod: 25

## 2025-03-31 PROCEDURE — 83605 ASSAY OF LACTIC ACID: CPT | Performed by: PHYSICIAN ASSISTANT

## 2025-03-31 PROCEDURE — 85025 COMPLETE CBC W/AUTO DIFF WBC: CPT | Performed by: PHYSICIAN ASSISTANT

## 2025-03-31 PROCEDURE — 80048 BASIC METABOLIC PNL TOTAL CA: CPT | Performed by: PHYSICIAN ASSISTANT

## 2025-03-31 PROCEDURE — 2550000001 HC RX 255 CONTRASTS: Performed by: PHYSICIAN ASSISTANT

## 2025-03-31 PROCEDURE — 2500000001 HC RX 250 WO HCPCS SELF ADMINISTERED DRUGS (ALT 637 FOR MEDICARE OP): Performed by: PHYSICIAN ASSISTANT

## 2025-03-31 RX ORDER — CLINDAMYCIN HYDROCHLORIDE 150 MG/1
450 CAPSULE ORAL ONCE
Status: COMPLETED | OUTPATIENT
Start: 2025-03-31 | End: 2025-03-31

## 2025-03-31 RX ORDER — CLINDAMYCIN HYDROCHLORIDE 150 MG/1
450 CAPSULE ORAL 3 TIMES DAILY
Qty: 90 CAPSULE | Refills: 0 | Status: SHIPPED | OUTPATIENT
Start: 2025-03-31 | End: 2025-04-10

## 2025-03-31 RX ORDER — ACETAMINOPHEN 500 MG
1000 TABLET ORAL EVERY 6 HOURS PRN
Qty: 30 TABLET | Refills: 0 | Status: SHIPPED | OUTPATIENT
Start: 2025-03-31 | End: 2025-04-10

## 2025-03-31 RX ADMIN — CLINDAMYCIN HYDROCHLORIDE 450 MG: 150 CAPSULE ORAL at 15:44

## 2025-03-31 RX ADMIN — IOHEXOL 75 ML: 350 INJECTION, SOLUTION INTRAVENOUS at 14:11

## 2025-03-31 ASSESSMENT — PAIN DESCRIPTION - ORIENTATION: ORIENTATION: LEFT

## 2025-03-31 ASSESSMENT — PAIN - FUNCTIONAL ASSESSMENT: PAIN_FUNCTIONAL_ASSESSMENT: 0-10

## 2025-03-31 ASSESSMENT — PAIN DESCRIPTION - PAIN TYPE: TYPE: ACUTE PAIN

## 2025-03-31 ASSESSMENT — COLUMBIA-SUICIDE SEVERITY RATING SCALE - C-SSRS
2. HAVE YOU ACTUALLY HAD ANY THOUGHTS OF KILLING YOURSELF?: NO
1. IN THE PAST MONTH, HAVE YOU WISHED YOU WERE DEAD OR WISHED YOU COULD GO TO SLEEP AND NOT WAKE UP?: NO
6. HAVE YOU EVER DONE ANYTHING, STARTED TO DO ANYTHING, OR PREPARED TO DO ANYTHING TO END YOUR LIFE?: NO

## 2025-03-31 ASSESSMENT — PAIN DESCRIPTION - LOCATION: LOCATION: FACE

## 2025-03-31 ASSESSMENT — PAIN DESCRIPTION - PROGRESSION: CLINICAL_PROGRESSION: GRADUALLY WORSENING

## 2025-03-31 ASSESSMENT — PAIN SCALES - GENERAL: PAINLEVEL_OUTOF10: 2

## 2025-03-31 ASSESSMENT — PAIN DESCRIPTION - FREQUENCY: FREQUENCY: CONSTANT/CONTINUOUS

## 2025-03-31 ASSESSMENT — PAIN DESCRIPTION - DESCRIPTORS: DESCRIPTORS: ACHING

## 2025-03-31 ASSESSMENT — LIFESTYLE VARIABLES
HAVE YOU EVER FELT YOU SHOULD CUT DOWN ON YOUR DRINKING: NO
TOTAL SCORE: 0
EVER FELT BAD OR GUILTY ABOUT YOUR DRINKING: NO
HAVE PEOPLE ANNOYED YOU BY CRITICIZING YOUR DRINKING: NO
EVER HAD A DRINK FIRST THING IN THE MORNING TO STEADY YOUR NERVES TO GET RID OF A HANGOVER: NO

## 2025-03-31 ASSESSMENT — PAIN DESCRIPTION - ONSET: ONSET: SUDDEN

## 2025-03-31 NOTE — Clinical Note
Dolly Mulligan was seen and treated in our emergency department on 3/31/2025.  She may return to work on 04/01/2025.       If you have any questions or concerns, please don't hesitate to call.      Vernon Jones PA-C

## 2025-03-31 NOTE — ED PROVIDER NOTES
HPI   Chief Complaint   Patient presents with    Oral Swelling     Left sided facial swelling.  On Clindamycin.       39-year-old female patient comes into the emergency department today with complaints of left facial swelling and pain.  Rates his pain 2 out of 10 on the pain scale.  States she went to [facility] yesterday as she had a cracked tooth and start developing pain in the left maxillary region.  Was put on p.o. clindamycin 300 mg once daily for potential infection.  Patient denies any associated fevers, chills, nausea vomiting.  For this purpose she comes in the emergency department today as she woke up with significant swelling in her face.              Patient History   Past Medical History:   Diagnosis Date    Bursitis of right shoulder 2017    Acute bursitis of right shoulder    Otalgia, unspecified ear 2017    Otalgia, unspecified laterality    Other enthesopathies, not elsewhere classified 2017    Tendinitis of right shoulder    Otorrhea, unspecified ear     Otorrhea, unspecified laterality    Personal history of other diseases of the nervous system and sense organs 2017    History of hearing loss    Personal history of other specified conditions 2017    History of headache     Past Surgical History:   Procedure Laterality Date     SECTION, CLASSIC  2017     Section    GALLBLADDER SURGERY  2017    Gallbladder Surgery    OTHER SURGICAL HISTORY  2020    Cholecystectomy    OTHER SURGICAL HISTORY  2020     section     Family History   Problem Relation Name Age of Onset    Other (deafness or hearing loss) Brother       Social History     Tobacco Use    Smoking status: Every Day     Types: Cigarettes    Smokeless tobacco: Never   Substance Use Topics    Alcohol use: Never    Drug use: Never       Physical Exam   ED Triage Vitals [25 1311]   Temperature Heart Rate Respirations BP   36.4 °C (97.5 °F) 94 20 155/82       Pulse Ox Temp Source Heart Rate Source Patient Position   98 % Temporal Monitor Sitting      BP Location FiO2 (%)     Right arm --       Physical Exam  Constitutional:       General: She is in acute distress.      Appearance: Normal appearance. She is not ill-appearing or diaphoretic.   HENT:      Head: Normocephalic and atraumatic.      Comments: Patient has significant swelling in the left maxillary region of the face with some induration.  Concerning for cellulitis versus abscess     Nose: Nose normal.      Mouth/Throat:      Mouth: Mucous membranes are moist.      Comments: Cracked dental molar but no signs of abscess within the mouth that can be drained  Eyes:      Extraocular Movements: Extraocular movements intact.      Conjunctiva/sclera: Conjunctivae normal.      Pupils: Pupils are equal, round, and reactive to light.   Cardiovascular:      Rate and Rhythm: Normal rate and regular rhythm.   Pulmonary:      Effort: Pulmonary effort is normal. No respiratory distress.      Breath sounds: Normal breath sounds. No stridor. No wheezing.   Musculoskeletal:         General: Normal range of motion.      Cervical back: Normal range of motion.   Skin:     General: Skin is warm and dry.   Neurological:      General: No focal deficit present.      Mental Status: She is alert and oriented to person, place, and time. Mental status is at baseline.   Psychiatric:         Mood and Affect: Mood normal.           ED Course & MDM   Diagnoses as of 03/31/25 1539   Dental abscess                 No data recorded     [place] Coma Scale Score: 15 (03/31/25 1324 : Yara Whalen RN)                           Medical Decision Making  39-year-old female patient comes into the emergency department today with complaints of left facial swelling and pain.  Rates his pain 2 out of 10 on the pain scale.  States she went to [facility] yesterday as she had a cracked tooth and start developing pain in the left maxillary region.  Was  put on p.o. clindamycin 300 mg once daily for potential infection.  Patient denies any associated fevers, chills, nausea vomiting.  For this purpose she comes in the emergency department today as she woke up with significant swelling in her face.    Laboratory studies ordered as well as CT study of the maxillofacial bones with contrast to rule out abscess.    Patient metabolic panel within normal limits lactate negative does have elevated white count 13.8 absolute neutral count 8.  CT of the facial bones shows a lot of inflammatory changes in the soft tissues.  There is a tiny subperiosteal abscess present the canine root.  Will improve patient's current antibiotic as it is dosed incorrectly.  I encouraged the patient to call her dentist to get an appointment set up soon as possible    Historian is the patient    Diagnosis: Dental abscess      Labs Reviewed   CBC WITH AUTO DIFFERENTIAL - Abnormal       Result Value    WBC 13.8 (*)     nRBC 0.0      RBC 4.91      Hemoglobin 14.4      Hematocrit 42.8      MCV 87      MCH 29.3      MCHC 33.6      RDW 14.1      Platelets 349      Neutrophils % 58.1      Immature Granulocytes %, Automated 0.3      Lymphocytes % 30.8      Monocytes % 7.5      Eosinophils % 2.8      Basophils % 0.5      Neutrophils Absolute 8.01 (*)     Immature Granulocytes Absolute, Automated 0.04      Lymphocytes Absolute 4.25      Monocytes Absolute 1.04 (*)     Eosinophils Absolute 0.38      Basophils Absolute 0.07     BASIC METABOLIC PANEL - Normal    Glucose 89      Sodium 138      Potassium 3.5      Chloride 104      Bicarbonate 27      Anion Gap 11      Urea Nitrogen 8      Creatinine 0.60      eGFR >90      Calcium 9.1     LACTATE - Normal    Lactate 0.8      Narrative:     Venipuncture immediately after or during the administration of Metamizole may lead to falsely low results. Testing should be performed immediately prior to Metamizole dosing.        CT facial bones w IV contrast   Final Result    Inflammatory changes in the left pre maxillary soft tissues.   Isocenter of inflammation appears to be overlying the left maxillary   canine root and there appears to be a tiny subperiosteal abscess   present.        Scattered dental caries.        MACRO:   None        Signed by: Gianni Villasenor 3/31/2025 3:25 PM   Dictation workstation:   [id number]            Procedure  Procedures     Vernon Jones PA-C  03/31/25 1539

## 2025-03-31 NOTE — CARE COORDINATION
Ambulatory Care Coordination Note     3/31/2025 3:42 PM     ACM outreach attempt by this ACM today to offer care management services. ACM was unable to reach the patient by telephone today;   left voice message requesting a return phone call to this ACM.     ACM: Dominique Jordan RN         PCP/Specialist follow up:       Follow Up:   Plan for next ACM outreach in approximately 1-2 days  to complete:  - outreach attempt to offer care management services.

## 2025-04-02 ENCOUNTER — CARE COORDINATION (OUTPATIENT)
Dept: OTHER | Facility: CLINIC | Age: 40
End: 2025-04-02

## 2025-04-02 NOTE — CARE COORDINATION
Ambulatory Care Coordination Note     4/2/2025 10:47 AM     ACM outreach attempt by this ACM today to offer care management services. ACM was unable to reach the patient by telephone today;   left voice message requesting a return phone call to this ACM.  letter mailed requesting patient  to contact this ACM. [facility]     ACM: Dominique Jordan RN         PCP/Specialist follow up:       Follow Up:   Plan for next ACM outreach in approximately 2 weeks to complete:  - outreach attempt to offer care management services.

## 2025-04-16 ENCOUNTER — CARE COORDINATION (OUTPATIENT)
Dept: OTHER | Facility: CLINIC | Age: 40
End: 2025-04-16

## 2025-04-16 NOTE — CARE COORDINATION
Ambulatory Care Coordination Note     4/16/2025 12:33 PM     patient outreach attempt by this ACM today to offer care management services. ACM was unable to reach the patient by telephone today;   left voice message requesting a return phone call to this ACM.  letter mailed requesting patient  to contact this ACM. By CCSS/ LPN      Patient closed (unable to reach patient) from the High Risk Care Management program on 4/16/2025.  No further Ambulatory Care Manager follow up scheduled.